# Patient Record
Sex: FEMALE | Race: WHITE | Employment: FULL TIME | URBAN - METROPOLITAN AREA
[De-identification: names, ages, dates, MRNs, and addresses within clinical notes are randomized per-mention and may not be internally consistent; named-entity substitution may affect disease eponyms.]

---

## 2024-07-02 ENCOUNTER — OFFICE VISIT (OUTPATIENT)
Dept: OBGYN CLINIC | Facility: CLINIC | Age: 49
End: 2024-07-02
Payer: COMMERCIAL

## 2024-07-02 VITALS
HEIGHT: 68 IN | SYSTOLIC BLOOD PRESSURE: 132 MMHG | DIASTOLIC BLOOD PRESSURE: 92 MMHG | BODY MASS INDEX: 24.55 KG/M2 | WEIGHT: 162 LBS

## 2024-07-02 DIAGNOSIS — N95.1 PERIMENOPAUSE: Primary | ICD-10-CM

## 2024-07-02 PROCEDURE — 3075F SYST BP GE 130 - 139MM HG: CPT | Performed by: OBSTETRICS & GYNECOLOGY

## 2024-07-02 PROCEDURE — 99203 OFFICE O/P NEW LOW 30 MIN: CPT | Performed by: OBSTETRICS & GYNECOLOGY

## 2024-07-02 PROCEDURE — 3080F DIAST BP >= 90 MM HG: CPT | Performed by: OBSTETRICS & GYNECOLOGY

## 2024-07-02 PROCEDURE — 3008F BODY MASS INDEX DOCD: CPT | Performed by: OBSTETRICS & GYNECOLOGY

## 2024-07-02 RX ORDER — NORETHINDRONE ACETATE AND ETHINYL ESTRADIOL AND FERROUS FUMARATE 1.5-30(21)
1 KIT ORAL DAILY
Qty: 112 TABLET | Refills: 3 | Status: SHIPPED | OUTPATIENT
Start: 2024-07-02

## 2024-07-02 RX ORDER — CLONAZEPAM 0.5 MG/1
0.5 TABLET ORAL DAILY PRN
COMMUNITY
Start: 2024-05-14

## 2024-07-02 RX ORDER — NORETHINDRONE ACETATE AND ETHINYL ESTRADIOL AND FERROUS FUMARATE 1.5-30(21)
KIT ORAL
COMMUNITY
Start: 2024-06-25 | End: 2024-07-02

## 2024-07-02 RX ORDER — ESCITALOPRAM OXALATE 20 MG/1
20 TABLET ORAL DAILY
COMMUNITY

## 2024-07-02 RX ORDER — BUPROPION HYDROCHLORIDE 150 MG/1
150 TABLET ORAL EVERY MORNING
COMMUNITY
Start: 2024-05-14

## 2024-07-02 NOTE — PATIENT INSTRUCTIONS
To address brain fog:  Ashwagandha - 300 mg 2 times per day.    Dyan Herbs    Zzzquil - 2 gummies at night to help with sleep quality.

## 2024-07-02 NOTE — PROGRESS NOTES
New Patient GYN History and Physical  [facility]    CHIEF COMPLAINT:    Chief Complaint   Patient presents with    Other     Pt states she believes she's started premenopause, states has missed three menstrual periods in the past yr but still having symptoms, anxiety levels have risen       HISTORY OF PRESENT ILLNESS:   Cherise Metz is a 48 year old female   who presents for    Milka-menopause  Has skipped 3 periods since September    Periods before this were like clockwork  No changes in period flow or duration when having one.    Increase in irritability, crying.  Is taking longer to fall asleep, sleeps through the night. Does get up to use the bathroom once per night.    Some hot flushes.  No vaginal dryness    + sex - no problems with sex (pain or bleeding)    No changes in energy level - exercises regularly which seems to help.    Taking buproprion, clonazepam PRN (half a pill occasionally) - works with therapist / counseler.      Taking OCPs - has been on for a very long time. Had stopped them once and periods were heavier / more cramps.    Mom had hysterectomy before menopause (for fibroids) and has no sisters.      PAST MEDICAL HISTORY:   Past Medical History:    Anxiety        PAST SURGICAL HISTORY:   Past Surgical History:   Procedure Laterality Date    Hernia repair Right     groin    Other surgical history Right     ankle        PAST OB HISTORY:  OB History    Para Term  AB Living   0 0 0 0 0 0   SAB IAB Ectopic Multiple Live Births   0 0 0 0 0       CURRENT MEDICATIONS:      Current Outpatient Medications:     escitalopram 20 MG Oral Tab, Take 1 tablet (20 mg total) by mouth daily., Disp: , Rfl:     buPROPion  MG Oral Tablet 24 Hr, Take 1 tablet (150 mg total) by mouth every morning., Disp: , Rfl:     JUNEL FE 1.5/30 1.5-30 MG-MCG Oral Tab, Take 1 tablet by mouth daily. Take continuously - skip placebo pills, Disp: 112 tablet, Rfl: 3    clonazePAM 0.5 MG Oral Tab, Take 1  tablet (0.5 mg total) by mouth daily as needed. (Patient not taking: Reported on 7/2/2024), Disp: , Rfl:     ALLERGIES:  No Known Allergies    SOCIAL HISTORY:  Social History     Socioeconomic History    Marital status:    Tobacco Use    Smoking status: Never    Smokeless tobacco: Never   Substance and Sexual Activity    Alcohol use: Not Currently     Comment: rarely - special occasions    Drug use: Never    Sexual activity: Yes     Birth control/protection: OCP   Other Topics Concern    Blood Transfusions No       FAMILY HISTORY:  Family History   Problem Relation Age of Onset    No Known Problems Father     Other (hysterectomy) Mother     Other (fibroids) Mother     Other (hashimotos) Mother     Other (brain tumor - benign) Mother     No Known Problems Brother      ASSESSMENTS:  PHYSICAL EXAM:   Patient's last menstrual period was 06/18/2024 (exact date).   Vitals:    07/02/24 1118   BP: (!) 132/92   Weight: 162 lb (73.5 kg)   Height: 68\"     CONSTITUTIONAL: Awake, alert, cooperative, no apparent distress, and appears stated age   NECK: Supple, symmetrical, trachea midline, no adenopathy, thyroid symmetric, not enlarged and no tenderness  LUNGS: No excess work of breathing  MUSCULOSKELETAL: There is no redness, warmth, or swelling of the joints. Tone is normal.  NEUROLOGIC: Patient is awake, alert and oriented to name, place and time. Casual gait is normal.  SKIN: no bruising or bleeding and no rashes  PSYCHIATRIC: Behavior:  Appropriate  Mood:  appropriate  ASSESSMENT AND PLAN:  1. Perimenopause  - is ok to skip periods - especially while on OCPS. The estrogen in her OCP is probably helping to minimize some symptoms.   - recommend ashwagandha (for anxiety and brain fog) and zzzquil gummies (for improved sleep quality)  - reviewed option for continuous OCPs - new rx sent.      follow up 3 months as needed  Total face to face time was 30 minutes, more than 50% of the time was spent in counseling and/or  coordination of care related to perimenopause as noted above  Ariadna Putnam, DO

## 2025-08-28 RX ORDER — NORETHINDRONE ACETATE AND ETHINYL ESTRADIOL AND FERROUS FUMARATE 1.5-30(21)
1 KIT ORAL DAILY
Qty: 28 TABLET | Refills: 0 | Status: SHIPPED | OUTPATIENT
Start: 2025-08-28